# Patient Record
Sex: FEMALE | Race: ASIAN | NOT HISPANIC OR LATINO | URBAN - METROPOLITAN AREA
[De-identification: names, ages, dates, MRNs, and addresses within clinical notes are randomized per-mention and may not be internally consistent; named-entity substitution may affect disease eponyms.]

---

## 2021-08-06 ENCOUNTER — INPATIENT (INPATIENT)
Facility: HOSPITAL | Age: 31
LOS: 1 days | Discharge: HOME | End: 2021-08-08
Attending: OBSTETRICS & GYNECOLOGY | Admitting: OBSTETRICS & GYNECOLOGY
Payer: COMMERCIAL

## 2021-08-06 VITALS
DIASTOLIC BLOOD PRESSURE: 60 MMHG | RESPIRATION RATE: 16 BRPM | OXYGEN SATURATION: 100 % | WEIGHT: 117.95 LBS | TEMPERATURE: 98 F | HEART RATE: 72 BPM | SYSTOLIC BLOOD PRESSURE: 102 MMHG | HEIGHT: 62 IN

## 2021-08-06 DIAGNOSIS — O41.00X0 OLIGOHYDRAMNIOS, UNSPECIFIED TRIMESTER, NOT APPLICABLE OR UNSPECIFIED: ICD-10-CM

## 2021-08-06 LAB
BASOPHILS # BLD AUTO: 0.04 K/UL — SIGNIFICANT CHANGE UP (ref 0–0.2)
BASOPHILS NFR BLD AUTO: 0.4 % — SIGNIFICANT CHANGE UP (ref 0–2)
BLD GP AB SCN SERPL QL: NEGATIVE — SIGNIFICANT CHANGE UP
COVID-19 SPIKE DOMAIN AB INTERP: POSITIVE
COVID-19 SPIKE DOMAIN ANTIBODY RESULT: >250 U/ML — HIGH
EOSINOPHIL # BLD AUTO: 0.05 K/UL — SIGNIFICANT CHANGE UP (ref 0–0.5)
EOSINOPHIL NFR BLD AUTO: 0.5 % — SIGNIFICANT CHANGE UP (ref 0–6)
GLUCOSE BLDC GLUCOMTR-MCNC: 96 MG/DL — SIGNIFICANT CHANGE UP (ref 70–99)
HCT VFR BLD CALC: 41.8 % — SIGNIFICANT CHANGE UP (ref 34.5–45)
HGB BLD-MCNC: 14.2 G/DL — SIGNIFICANT CHANGE UP (ref 11.5–15.5)
IMM GRANULOCYTES NFR BLD AUTO: 0.2 % — SIGNIFICANT CHANGE UP (ref 0–1.5)
LYMPHOCYTES # BLD AUTO: 2.64 K/UL — SIGNIFICANT CHANGE UP (ref 1–3.3)
LYMPHOCYTES # BLD AUTO: 28.9 % — SIGNIFICANT CHANGE UP (ref 13–44)
MCHC RBC-ENTMCNC: 31.8 PG — SIGNIFICANT CHANGE UP (ref 27–34)
MCHC RBC-ENTMCNC: 34 GM/DL — SIGNIFICANT CHANGE UP (ref 32–36)
MCV RBC AUTO: 93.5 FL — SIGNIFICANT CHANGE UP (ref 80–100)
MONOCYTES # BLD AUTO: 0.52 K/UL — SIGNIFICANT CHANGE UP (ref 0–0.9)
MONOCYTES NFR BLD AUTO: 5.7 % — SIGNIFICANT CHANGE UP (ref 2–14)
NEUTROPHILS # BLD AUTO: 5.85 K/UL — SIGNIFICANT CHANGE UP (ref 1.8–7.4)
NEUTROPHILS NFR BLD AUTO: 64.3 % — SIGNIFICANT CHANGE UP (ref 43–77)
NRBC # BLD: 0 /100 WBCS — SIGNIFICANT CHANGE UP (ref 0–0)
PLATELET # BLD AUTO: 161 K/UL — SIGNIFICANT CHANGE UP (ref 150–400)
RBC # BLD: 4.47 M/UL — SIGNIFICANT CHANGE UP (ref 3.8–5.2)
RBC # FLD: 12.9 % — SIGNIFICANT CHANGE UP (ref 10.3–14.5)
RH IG SCN BLD-IMP: POSITIVE — SIGNIFICANT CHANGE UP
RH IG SCN BLD-IMP: POSITIVE — SIGNIFICANT CHANGE UP
SARS-COV-2 IGG+IGM SERPL QL IA: >250 U/ML — HIGH
SARS-COV-2 IGG+IGM SERPL QL IA: POSITIVE
SARS-COV-2 RNA SPEC QL NAA+PROBE: SIGNIFICANT CHANGE UP
T PALLIDUM AB TITR SER: NEGATIVE — SIGNIFICANT CHANGE UP
WBC # BLD: 9.12 K/UL — SIGNIFICANT CHANGE UP (ref 3.8–10.5)
WBC # FLD AUTO: 9.12 K/UL — SIGNIFICANT CHANGE UP (ref 3.8–10.5)

## 2021-08-06 RX ORDER — PRAMOXINE HYDROCHLORIDE 150 MG/15G
1 AEROSOL, FOAM RECTAL EVERY 4 HOURS
Refills: 0 | Status: DISCONTINUED | OUTPATIENT
Start: 2021-08-06 | End: 2021-08-08

## 2021-08-06 RX ORDER — DIPHENHYDRAMINE HCL 50 MG
25 CAPSULE ORAL EVERY 6 HOURS
Refills: 0 | Status: DISCONTINUED | OUTPATIENT
Start: 2021-08-06 | End: 2021-08-08

## 2021-08-06 RX ORDER — BENZOCAINE 10 %
1 GEL (GRAM) MUCOUS MEMBRANE EVERY 6 HOURS
Refills: 0 | Status: DISCONTINUED | OUTPATIENT
Start: 2021-08-06 | End: 2021-08-08

## 2021-08-06 RX ORDER — KETOROLAC TROMETHAMINE 30 MG/ML
30 SYRINGE (ML) INJECTION ONCE
Refills: 0 | Status: DISCONTINUED | OUTPATIENT
Start: 2021-08-06 | End: 2021-08-06

## 2021-08-06 RX ORDER — HYDROCORTISONE 1 %
1 OINTMENT (GRAM) TOPICAL EVERY 6 HOURS
Refills: 0 | Status: DISCONTINUED | OUTPATIENT
Start: 2021-08-06 | End: 2021-08-08

## 2021-08-06 RX ORDER — SODIUM CHLORIDE 9 MG/ML
1000 INJECTION, SOLUTION INTRAVENOUS
Refills: 0 | Status: DISCONTINUED | OUTPATIENT
Start: 2021-08-06 | End: 2021-08-06

## 2021-08-06 RX ORDER — OXYCODONE HYDROCHLORIDE 5 MG/1
5 TABLET ORAL
Refills: 0 | Status: DISCONTINUED | OUTPATIENT
Start: 2021-08-06 | End: 2021-08-08

## 2021-08-06 RX ORDER — TETANUS TOXOID, REDUCED DIPHTHERIA TOXOID AND ACELLULAR PERTUSSIS VACCINE, ADSORBED 5; 2.5; 8; 8; 2.5 [IU]/.5ML; [IU]/.5ML; UG/.5ML; UG/.5ML; UG/.5ML
0.5 SUSPENSION INTRAMUSCULAR ONCE
Refills: 0 | Status: DISCONTINUED | OUTPATIENT
Start: 2021-08-06 | End: 2021-08-08

## 2021-08-06 RX ORDER — IBUPROFEN 200 MG
600 TABLET ORAL EVERY 6 HOURS
Refills: 0 | Status: DISCONTINUED | OUTPATIENT
Start: 2021-08-06 | End: 2021-08-08

## 2021-08-06 RX ORDER — ACETAMINOPHEN 500 MG
975 TABLET ORAL
Refills: 0 | Status: DISCONTINUED | OUTPATIENT
Start: 2021-08-06 | End: 2021-08-08

## 2021-08-06 RX ORDER — CITRIC ACID/SODIUM CITRATE 300-500 MG
15 SOLUTION, ORAL ORAL EVERY 6 HOURS
Refills: 0 | Status: DISCONTINUED | OUTPATIENT
Start: 2021-08-06 | End: 2021-08-06

## 2021-08-06 RX ORDER — DIBUCAINE 1 %
1 OINTMENT (GRAM) RECTAL EVERY 6 HOURS
Refills: 0 | Status: DISCONTINUED | OUTPATIENT
Start: 2021-08-06 | End: 2021-08-08

## 2021-08-06 RX ORDER — OXYTOCIN 10 UNIT/ML
333.33 VIAL (ML) INJECTION
Qty: 20 | Refills: 0 | Status: DISCONTINUED | OUTPATIENT
Start: 2021-08-06 | End: 2021-08-08

## 2021-08-06 RX ORDER — OXYCODONE HYDROCHLORIDE 5 MG/1
5 TABLET ORAL ONCE
Refills: 0 | Status: DISCONTINUED | OUTPATIENT
Start: 2021-08-06 | End: 2021-08-08

## 2021-08-06 RX ORDER — AER TRAVELER 0.5 G/1
1 SOLUTION RECTAL; TOPICAL EVERY 4 HOURS
Refills: 0 | Status: DISCONTINUED | OUTPATIENT
Start: 2021-08-06 | End: 2021-08-08

## 2021-08-06 RX ORDER — SODIUM CHLORIDE 9 MG/ML
3 INJECTION INTRAMUSCULAR; INTRAVENOUS; SUBCUTANEOUS EVERY 8 HOURS
Refills: 0 | Status: DISCONTINUED | OUTPATIENT
Start: 2021-08-06 | End: 2021-08-08

## 2021-08-06 RX ORDER — LANOLIN
1 OINTMENT (GRAM) TOPICAL EVERY 6 HOURS
Refills: 0 | Status: DISCONTINUED | OUTPATIENT
Start: 2021-08-06 | End: 2021-08-08

## 2021-08-06 RX ORDER — MAGNESIUM HYDROXIDE 400 MG/1
30 TABLET, CHEWABLE ORAL
Refills: 0 | Status: DISCONTINUED | OUTPATIENT
Start: 2021-08-06 | End: 2021-08-08

## 2021-08-06 RX ORDER — SIMETHICONE 80 MG/1
80 TABLET, CHEWABLE ORAL EVERY 4 HOURS
Refills: 0 | Status: DISCONTINUED | OUTPATIENT
Start: 2021-08-06 | End: 2021-08-08

## 2021-08-06 RX ORDER — IBUPROFEN 200 MG
600 TABLET ORAL EVERY 6 HOURS
Refills: 0 | Status: COMPLETED | OUTPATIENT
Start: 2021-08-06 | End: 2022-07-05

## 2021-08-06 RX ADMIN — Medication 975 MILLIGRAM(S): at 08:55

## 2021-08-06 RX ADMIN — SODIUM CHLORIDE 3 MILLILITER(S): 9 INJECTION INTRAMUSCULAR; INTRAVENOUS; SUBCUTANEOUS at 06:15

## 2021-08-06 RX ADMIN — Medication 975 MILLIGRAM(S): at 14:49

## 2021-08-06 RX ADMIN — Medication 600 MILLIGRAM(S): at 17:40

## 2021-08-06 RX ADMIN — Medication 600 MILLIGRAM(S): at 11:57

## 2021-08-06 RX ADMIN — Medication 30 MILLIGRAM(S): at 06:14

## 2021-08-06 RX ADMIN — Medication 30 MILLIGRAM(S): at 08:54

## 2021-08-06 RX ADMIN — Medication 975 MILLIGRAM(S): at 22:25

## 2021-08-06 RX ADMIN — Medication 975 MILLIGRAM(S): at 21:53

## 2021-08-06 RX ADMIN — Medication 1 TABLET(S): at 11:57

## 2021-08-06 NOTE — OB PROVIDER LABOR PROGRESS NOTE - ASSESSMENT
Pt admitted for induction but was already in labor. AROM clear, s/p epidural, will start pushing. cm

## 2021-08-06 NOTE — OB PROVIDER H&P - ASSESSMENT
A/P: 31yo G1 @ 39w4d here for IOL 2/2 oligo and A1  - Admit to L&D  - Routine labs including COVID swab for PCR  - EFM/TOCO: resuscitative measures prn  - 4 AROM  - Anticipate     d/w Dr. Cheatham

## 2021-08-06 NOTE — OB PROVIDER H&P - HISTORY OF PRESENT ILLNESS
Pt is a 31yo G1 @ 39w4d here for IOL 2/2 oligo (ARA 3) and GDMA1. Pt. reports ctx 3mins. Pt. denies LOF, VB. +FM. PNC c/b oligo and A1. GBS (-) EFW 3175g    OBHx: 1st baby  GYNHx: denies ovarian cysts, fibroids, endometriosis, hx of abnormal pap or STDs  PMHx: denies  PSurgHx: denies  Allergies: NKDA  Meds: PNV, Iron  Social: No smoking, alcohol, or illicit drug use  Psych: No hx of anxiety or depression

## 2021-08-06 NOTE — PRE-ANESTHESIA EVALUATION ADULT - NSANTHOSAYNRD_GEN_A_CORE
No. LITO screening performed.  STOP BANG Legend: 0-2 = LOW Risk; 3-4 = INTERMEDIATE Risk; 5-8 = HIGH Risk

## 2021-08-06 NOTE — OB RN PATIENT PROFILE - NAME OF FATHER, OB PROFILE
Refill Request: Yes    Requesting 90 day supply? No    Pharmacy loaded? Yes    Patient wants call back from medical staff?  Yes    Okay to leave a detailed voice mail?  Yes    Comments: hydrocodone-acetaminophen (VICODIN) 5-500 MG per tablet    Patient is aware it will take 24-48 hours to process refill request.  Yes      [facility] [address] [place] [address] [address]   P: [phone]   F: [phone]     Frederick Salazar

## 2021-08-06 NOTE — OB PROVIDER DELIVERY SUMMARY - NSSELHIDDEN_OBGYN_ALL_OB_FT
[NS_DeliveryAttending1_OBGYN_ALL_OB_FT:[id number]==],[NS_DeliveryAssist1_OBGYN_ALL_OB_FT:[id number]=],[NS_DeliveryRN_OBGYN_ALL_OB_FT:[id number]=]

## 2021-08-06 NOTE — OB PROVIDER H&P - NSHPPHYSICALEXAM_GEN_ALL_CORE
PE:    T(C): --  HR: 69 (08-06-21 @ 02:35) (66 - 72)  BP: 102/60 (08-06-21 @ 02:09) (102/60 - 102/60)  RR: --  SpO2: 94% (08-06-21 @ 02:35) (87% - 100%)    General: NAD, A&Ox3  CV: RRR  Lungs: Clear bilat   Abd: soft, nontender, gravid  VE: 9/90/0 from 1.5 yesterday in office  Bedside sono: vertex  EFM: 135/moderate variablity/+accels/-decels  TOCO: q2-3m

## 2021-08-06 NOTE — OB RN DELIVERY SUMMARY - NSSELHIDDEN_OBGYN_ALL_OB_FT
[NS_DeliveryAttending1_OBGYN_ALL_OB_FT:[id number]],[NS_DeliveryAssist1_OBGYN_ALL_OB_FT:[id number]=],[NS_DeliveryRN_OBGYN_ALL_OB_FT:[id number]=]

## 2021-08-06 NOTE — OB PROVIDER DELIVERY SUMMARY - NSPROVIDERDELIVERYNOTE_OBGYN_ALL_OB_FT
over midline laceration, vaginal and 2nd degree. Placenta spont/intact/3 vessel, uterus explored.  Vaginal laceration and perineal repaired with chromic and vicryl.  To RR stable

## 2021-08-06 NOTE — PRE-ANESTHESIA EVALUATION ADULT - NSANTHPMHFT_GEN_ALL_CORE
IUP; oligohydramnios; left hearing loss; tachycardia; chronic vulvitis; abnl glucose tolerance test; gestational DM on insulin

## 2021-08-06 NOTE — PRE-ANESTHESIA EVALUATION ADULT - NSANTHAPLANRD_GEN_ALL_CORE
PROCEDURE:  CHEST RADIOGRAPH, 1 VIEW



HISTORY:

confusion



COMPARISON:

Chest radiograph dated 01/14/2013



FINDINGS:



LUNGS:

Clear.



PLEURA:

No pneumothorax or pleural fluid seen.



CARDIOVASCULAR:

Normal.



OSSEOUS STRUCTURES:

Unchanged.



VISUALIZED UPPER ABDOMEN:

Normal.



OTHER FINDINGS:

None. 



IMPRESSION:

No active disease. regional

## 2021-08-07 RX ORDER — ACETAMINOPHEN 500 MG
3 TABLET ORAL
Qty: 0 | Refills: 0 | DISCHARGE
Start: 2021-08-07

## 2021-08-07 RX ORDER — IBUPROFEN 200 MG
1 TABLET ORAL
Qty: 0 | Refills: 0 | DISCHARGE
Start: 2021-08-07

## 2021-08-07 RX ADMIN — Medication 600 MILLIGRAM(S): at 00:50

## 2021-08-07 RX ADMIN — Medication 600 MILLIGRAM(S): at 13:30

## 2021-08-07 RX ADMIN — Medication 975 MILLIGRAM(S): at 10:20

## 2021-08-07 RX ADMIN — Medication 975 MILLIGRAM(S): at 09:48

## 2021-08-07 RX ADMIN — Medication 975 MILLIGRAM(S): at 21:25

## 2021-08-07 RX ADMIN — Medication 600 MILLIGRAM(S): at 06:30

## 2021-08-07 RX ADMIN — Medication 600 MILLIGRAM(S): at 00:23

## 2021-08-07 RX ADMIN — Medication 975 MILLIGRAM(S): at 21:55

## 2021-08-07 RX ADMIN — Medication 600 MILLIGRAM(S): at 23:43

## 2021-08-07 RX ADMIN — Medication 600 MILLIGRAM(S): at 12:50

## 2021-08-07 RX ADMIN — Medication 975 MILLIGRAM(S): at 16:00

## 2021-08-07 RX ADMIN — Medication 1 TABLET(S): at 12:50

## 2021-08-07 RX ADMIN — Medication 975 MILLIGRAM(S): at 03:15

## 2021-08-07 RX ADMIN — Medication 600 MILLIGRAM(S): at 18:08

## 2021-08-07 RX ADMIN — Medication 975 MILLIGRAM(S): at 03:45

## 2021-08-07 RX ADMIN — Medication 975 MILLIGRAM(S): at 15:28

## 2021-08-07 RX ADMIN — Medication 600 MILLIGRAM(S): at 06:00

## 2021-08-07 NOTE — DISCHARGE NOTE OB - CARE PROVIDER_API CALL
Arron Coto)  Medicine  Critical Care  [address] [facility], [place] [address]  Phone: [phone]  Fax: [phone]  Follow Up Time:

## 2021-08-07 NOTE — DIETITIAN INITIAL EVALUATION ADULT. - OTHER INFO
Pt  S/P Vaginal Delivery day#1. Pt antepartum course significant for GDM1 reports good glycemic control. Pt reports monitoring BG 4xday at home with results <90 mg/dl when fasting and <140 mg/dl post-prandial. Pt reports good appetite and PO intake, tolerating regular diet. Denies GI distress. Pt with plans to breastfeed baby. Pt reports attending outpatient endocrinologist.     Provided education on GDM follow up including 2-hr GTT in 4-12 weeks. Educated on risk to develop T2DM and strategies to decrease risk. Recommended resume regular diet with mindful eating and physical activity. Provided education on increased kcal and protein needs with breastfeeding. Recommended continue pre- Multivitamin for breastfeeding needs. Stressed the importance of drinking water to maintain a good hydration. Provided handout with education. Pt amenable for education.

## 2021-08-07 NOTE — DIETITIAN INITIAL EVALUATION ADULT. - ORAL INTAKE PTA/DIET HISTORY
Pt reports good appetite and PO intake at home. Confirms NKFA. Reports following a low carbohydrate diet for GDM at home. Reports taking pre-elana Multivitamin and Iron PTA.

## 2021-08-07 NOTE — DISCHARGE NOTE OB - MATERIALS PROVIDED
[facility] [place] Screening Program/[place]  Immunization Record/Breastfeeding Log/Breastfeeding Mother’s Support Group Information/Guide to Postpartum Care/[facility] Hearing Screen Program/Back To Sleep Handout/Shaken Baby Prevention Handout/Breastfeeding Guide and Packet/Birth Certificate Instructions/Discharge Medication Information for Patients and Families Pocket Guide

## 2021-08-07 NOTE — DIETITIAN INITIAL EVALUATION ADULT. - NS FNS WEIGHT CHANGE REASON
Planned weight gain for pregnancy. Pre-pregnancy weight 100 pounds. Weight at delivery 117 pounds - 17 pounds weight gain during pregnancy.

## 2021-08-07 NOTE — DISCHARGE NOTE OB - NS OB DC PAIN RELIEF
Tylenol for minor pain as directed/If you have episitomy or tear repair, use warm water sitz bath for relief of discomfort/If you have episitomy or tear repair, use anesthetic spray or cream as directed by your healthcare provider for relief of discomfort

## 2021-08-07 NOTE — DIETITIAN INITIAL EVALUATION ADULT. - PERSON TAUGHT/METHOD
Post-partum GDM/verbal instruction/written material/patient instructed/spouse instructed/teach back - (Patient repeats in own words)

## 2021-08-07 NOTE — DIETITIAN INITIAL EVALUATION ADULT. - REASON INDICATOR FOR ASSESSMENT
Nutrition consult received for post-partum GDM education.  Information obtained from: medical record and pt;  present at bedside.

## 2021-08-07 NOTE — DIETITIAN INITIAL EVALUATION ADULT. - ADD RECOMMEND
1. Will continue to monitor PO intake, weight, labs, skin, GI status, diet. 2. Recommend continue pre-elana Multivitamin for breastfeeding. 3. Post-partum GDM education provided with handout.

## 2021-08-07 NOTE — DISCHARGE NOTE OB - PATIENT PORTAL LINK FT
You can access the FollowMyHealth Patient Portal offered by [facility] by registering at the following website: [email or web address]. By joining [employer]’s FollowMyHealth portal, you will also be able to view your health information using other applications (apps) compatible with our system.

## 2021-08-08 VITALS
HEART RATE: 66 BPM | OXYGEN SATURATION: 97 % | DIASTOLIC BLOOD PRESSURE: 75 MMHG | TEMPERATURE: 98 F | SYSTOLIC BLOOD PRESSURE: 118 MMHG | RESPIRATION RATE: 17 BRPM

## 2021-08-08 PROCEDURE — 86901 BLOOD TYPING SEROLOGIC RH(D): CPT

## 2021-08-08 PROCEDURE — 87635 SARS-COV-2 COVID-19 AMP PRB: CPT

## 2021-08-08 PROCEDURE — 86769 SARS-COV-2 COVID-19 ANTIBODY: CPT

## 2021-08-08 PROCEDURE — 59050 FETAL MONITOR W/REPORT: CPT

## 2021-08-08 PROCEDURE — 86780 TREPONEMA PALLIDUM: CPT

## 2021-08-08 PROCEDURE — 90707 MMR VACCINE SC: CPT

## 2021-08-08 PROCEDURE — 86850 RBC ANTIBODY SCREEN: CPT

## 2021-08-08 PROCEDURE — 59025 FETAL NON-STRESS TEST: CPT

## 2021-08-08 PROCEDURE — 82962 GLUCOSE BLOOD TEST: CPT

## 2021-08-08 PROCEDURE — 86900 BLOOD TYPING SEROLOGIC ABO: CPT

## 2021-08-08 PROCEDURE — 85025 COMPLETE CBC W/AUTO DIFF WBC: CPT

## 2021-08-08 RX ADMIN — Medication 600 MILLIGRAM(S): at 12:25

## 2021-08-08 RX ADMIN — Medication 600 MILLIGRAM(S): at 00:13

## 2021-08-08 RX ADMIN — Medication 600 MILLIGRAM(S): at 06:07

## 2021-08-08 RX ADMIN — Medication 0.5 MILLILITER(S): at 15:25

## 2021-08-08 RX ADMIN — Medication 975 MILLIGRAM(S): at 03:12

## 2021-08-08 RX ADMIN — Medication 600 MILLIGRAM(S): at 12:55

## 2021-08-08 RX ADMIN — Medication 975 MILLIGRAM(S): at 02:42

## 2021-08-08 RX ADMIN — Medication 1 TABLET(S): at 12:25

## 2021-08-08 RX ADMIN — Medication 975 MILLIGRAM(S): at 15:25

## 2021-08-08 RX ADMIN — Medication 975 MILLIGRAM(S): at 15:55

## 2021-08-08 RX ADMIN — Medication 600 MILLIGRAM(S): at 06:37

## 2021-08-08 NOTE — PROGRESS NOTE ADULT - SUBJECTIVE AND OBJECTIVE BOX
PPD#2    S: Patient stayed o/n as baby being followed for bilirubin.  Pt in the shower at the time but no complains reported by the     O: Vital Signs Last 24 Hrs  T(C): 36.8 (08 Aug 2021 05:00), Max: 36.8 (08 Aug 2021 05:00)  T(F): 98.2 (08 Aug 2021 05:00), Max: 98.2 (08 Aug 2021 05:00)  HR: 66 (08 Aug 2021 05:00) (66 - 94)  BP: 118/75 (08 Aug 2021 05:00) (109/71 - 118/75)  BP(mean): --  RR: 17 (08 Aug 2021 05:00) (17 - 18)  SpO2: 97% (08 Aug 2021 05:00) (97% - 100%)        A: 30y PPD#2 s/p  doing well.    Plan:  Discharge home today  juancho
